# Patient Record
Sex: FEMALE | Race: WHITE | Employment: FULL TIME | URBAN - METROPOLITAN AREA
[De-identification: names, ages, dates, MRNs, and addresses within clinical notes are randomized per-mention and may not be internally consistent; named-entity substitution may affect disease eponyms.]

---

## 2017-04-04 ENCOUNTER — OFFICE VISIT (OUTPATIENT)
Dept: URGENT CARE | Facility: CLINIC | Age: 52
End: 2017-04-04
Payer: COMMERCIAL

## 2017-04-04 VITALS
HEART RATE: 76 BPM | BODY MASS INDEX: 36.03 KG/M2 | TEMPERATURE: 98.8 F | DIASTOLIC BLOOD PRESSURE: 74 MMHG | WEIGHT: 266 LBS | HEIGHT: 72 IN | SYSTOLIC BLOOD PRESSURE: 110 MMHG | OXYGEN SATURATION: 97 %

## 2017-04-04 DIAGNOSIS — B86 SCABIES: ICD-10-CM

## 2017-04-04 PROCEDURE — 99213 OFFICE O/P EST LOW 20 MIN: CPT | Performed by: FAMILY MEDICINE

## 2017-04-04 RX ORDER — PERMETHRIN 50 MG/G
1 CREAM TOPICAL ONCE
Qty: 1 TUBE | Refills: 1 | Status: SHIPPED | OUTPATIENT
Start: 2017-04-04 | End: 2017-04-04

## 2017-04-04 RX ORDER — IVERMECTIN 3 MG/1
200 TABLET ORAL ONCE
Qty: 8 TAB | Refills: 1 | Status: SHIPPED | OUTPATIENT
Start: 2017-04-04 | End: 2017-04-04

## 2017-04-04 ASSESSMENT — ENCOUNTER SYMPTOMS
FEVER: 0
VOMITING: 0
CHILLS: 0
SHORTNESS OF BREATH: 0
HEADACHES: 0
NAUSEA: 0
FATIGUE: 0

## 2017-04-04 NOTE — PATIENT INSTRUCTIONS
Body Lice, Frequently Asked Questions  Body lice are insects that live on the body and in the clothing or bedding of infested humans. Infestation is common, found worldwide, and affects people of all races. Body lice infestations spread rapidly under crowded conditions where hygiene is poor and where there is frequent contact among people.   WHERE ARE BODY LICE FOUND?  Body lice are found on the body and on clothing or bedding used by infested people. Lice eggs are laid in the seams of clothing or on bedding. Occasionally eggs are attached to body hair. Lice found on the hair and head are not body lice; they are head lice. Lice are usually associated with poor personal hygiene, which may occur during war or natural disaster. Infestation is unlikely in anyone who bathes regularly.  CAN BODY LICE TRANSMIT DISEASE?  Yes. Epidemics of typhus and louse-borne relapsing fever have been caused by body lice. (Louse is the singular form of lice.) Though typhus is no longer widespread, epidemics still occur during times of war, civil unrest, natural disasters, in refugee camps, and in prisons where people live crowded together in unsanitary conditions. Typhus still exists in places where climate, chronic poverty, and social customs prevent regular changes and laundering of clothing.  WHAT ARE THE SIGNS AND SYMPTOMS OF BODY LICE?  Itching and rash are common. Both of these symptoms are your body's allergic reaction to the lice bite. Severe infestation can cause fever, body and headaches. Long-term body lice infestations may lead to thickening and discoloration of the skin, particularly around the waist, groin, and upper thighs. Sores on the body may be caused by scratching. These sores can sometimes become infected with bacteria or fungi.  HOW ARE BODY LICE SPREAD?  Body lice are spread directly through contact with a person who has body lice, or indirectly through shared clothing, beds, bed linens, or towels.  WHAT DO BODY  LICE LOOK LIKE?  There are three forms of body lice:   · The egg (sometimes called a nit).   · Nits are body lice eggs. They are generally easy to see in the seams of clothing, particularly around the waistline and under armpits. They are a bit smaller than the size of a pinhead. Nits may also be attached to body hair. They are oval and usually yellow to white. Nits may take 30 days to delvalle.   · The nymph.   · The egg hatches into a baby louse called a nymph. It looks like an adult body louse but is smaller. Nymphs mature into adults about 7 days after hatching. To live, the nymph must feed on blood.   · The adult.   · The adult body louse is about the size of a sesame seed, has 6 legs and is tan to grayish-white. Females lay eggs. To live, adult lice need to feed on blood. If the louse falls off of a person, it dies within 10 days.   HOW IS A BODY LICE INFESTATION DIAGNOSED?  Diagnosis is made by looking closely in the seams of clothing and on the body for eggs and for crawling lice. Diagnosis should be made by a health care provider if you are unsure about infestation.   HOW ARE BODY LICE TREATED?  Lice infestations are generally treated by giving the infested person a clean change of clothes, a shower, and by laundering all worn clothing, bed linens, and towels. When laundering items, use the hot cycle (130°F / 55°C) of the washing machine. Set the dryer to the hot cycle to dry items. Items that cannot be laundered may be stored in a sealed plastic bag for 2 weeks or thrown away. Additionally, a 1% permethrin or pyrethrin lice shampoo, (also called pediculicide), may be applied to the body. Medication should be applied exactly as directed on the bottle or by your caregiver. Medicine is generally not needed if good hygiene is maintained and if laundering can be done at least once a week.  Document Released: 08/29/2005 Document Revised: 03/11/2013 Document Reviewed: 07/06/2009  ExitCare® Patient Information ©2013  [facility].

## 2017-04-04 NOTE — PROGRESS NOTES
"Subjective:      Jose Verma is a 51 y.o. female who presents with Other            Other  This is a new problem. The current episode started 1 to 4 weeks ago (10 days). The problem has been gradually worsening. Associated symptoms include a rash. Pertinent negatives include no chest pain, chills, fatigue, fever, headaches, nausea or vomiting. Exacerbated by: worse at night  Treatments tried: alcohol, cortisone, alovera, tea tree oil. The treatment provided mild relief.       Review of Systems   Constitutional: Negative for fever, chills and fatigue.   Respiratory: Negative for shortness of breath.    Cardiovascular: Negative for chest pain.   Gastrointestinal: Negative for nausea and vomiting.   Skin: Positive for rash.   Neurological: Negative for headaches.     PMH:  has no past medical history of Allergy, unspecified not elsewhere classified, Arthritis, Unspecified asthma(493.90), or Type II or unspecified type diabetes mellitus without mention of complication, not stated as uncontrolled.  MEDS: No current outpatient prescriptions on file.  ALLERGIES:   Allergies   Allergen Reactions   • Tussionex Pennkinetic Er [Chlorpheniramine-Hydrocodone]      SURGHX:   Past Surgical History   Procedure Laterality Date   • Tubal coagulation laparoscopic bilateral       SOCHX:  reports that she has never smoked. She has never used smokeless tobacco. She reports that she drinks alcohol. She reports that she does not use illicit drugs.  FH: Family history was reviewed, no pertinent findings to report      Objective:     /74 mmHg  Pulse 76  Temp(Src) 37.1 °C (98.8 °F)  Ht 1.854 m (6' 1\")  Wt 120.657 kg (266 lb)  BMI 35.10 kg/m2  SpO2 97%  Breastfeeding? No     Physical Exam   Constitutional: She appears well-developed. No distress.   Pulmonary/Chest: Effort normal.   Skin: Skin is warm and dry. She is not diaphoretic. No erythema.        Psychiatric: She has a normal mood and affect. Her behavior is normal. "               Assessment/Plan:     1. Scabies  Ivermectin 3 MG Tab    permethrin (ELIMITE) 5 % Cream     Supportive care  Push fluids  Monitor temperature  Follow-up if symptoms worsen or fail to improve

## 2017-04-04 NOTE — MR AVS SNAPSHOT
"        Jose Verma   2017 3:30 PM   Office Visit   MRN: [id number]    Department:  [facility]   Dept Phone:  [phone]    Description:  Female : 1965   Provider:  Percy Liriano M.D.           Reason for Visit     Other itchy - Scabies exposure. Roommate being treated      Allergies as of 2017     Allergen Noted Reactions    Tussionex Pennkinetic Er [Chlorpheniramine-Hydrocodone] 2014         Vital Signs     Blood Pressure Pulse Temperature Height Weight Body Mass Index    110/74 mmHg 76 37.1 °C (98.8 °F) 1.854 m (6' 1\") 120.657 kg (266 lb) 35.10 kg/m2    Oxygen Saturation Breastfeeding? Smoking Status             97% No Never Smoker          Basic Information     Date Of Birth Sex Race Ethnicity Preferred Language    1965 Female White Unknown English      Health Maintenance        Date Due Completion Dates    IMM DTaP/Tdap/Td Vaccine (1 - Tdap) 1984 ---    PAP SMEAR 1986 ---    MAMMOGRAM 2008, 2006, 2006    COLONOSCOPY 2015 ---            Current Immunizations     No immunizations on file.      Below and/or attached are the medications your provider expects you to take. Review all of your home medications and newly ordered medications with your provider and/or pharmacist. Follow medication instructions as directed by your provider and/or pharmacist. Please keep your medication list with you and share with your provider. Update the information when medications are discontinued, doses are changed, or new medications (including over-the-counter products) are added; and carry medication information at all times in the event of emergency situations     Allergies:  TUSSIONEX PENNKINETIC ER - (reactions not documented)               Medications  Valid as of: 2017 -  3:56 PM    Generic Name Brand Name Tablet Size Instructions for use    .                 Medicines prescribed today were sent to:     [id number] - [place] - [address] [facility]    [address] [facility] [place] [address]    Phone: [phone] Fax: [phone]    Open 24 Hours?: No      Medication refill instructions:       If your prescription bottle indicates you have medication refills left, it is not necessary to call your provider’s office. Please contact your pharmacy and they will refill your medication.    If your prescription bottle indicates you do not have any refills left, you may request refills at any time through one of the following ways: The online [facility] system (except Urgent Care), by calling your provider’s office, or by asking your pharmacy to contact your provider’s office with a refill request. Medication refills are processed only during regular business hours and may not be available until the next business day. Your provider may request additional information or to have a follow-up visit with you prior to refilling your medication.   *Please Note: Medication refills are assigned a new Rx number when refilled electronically. Your pharmacy may indicate that no refills were authorized even though a new prescription for the same medication is available at the pharmacy. Please request the medicine by name with the pharmacy before contacting your provider for a refill.        Instructions    Body Lice, Frequently Asked Questions  Body lice are insects that live on the body and in the clothing or bedding of infested humans. Infestation is common, found worldwide, and affects people of all races. Body lice infestations spread rapidly under crowded conditions where hygiene is poor and where there is frequent contact among people.   WHERE ARE BODY LICE FOUND?  Body lice are found on the body and on clothing or bedding used by infested people. Lice eggs are laid in the seams of clothing or on bedding. Occasionally eggs are attached to body hair. Lice found on the hair and head are not body lice; they are head lice. Lice are usually associated with poor personal  hygiene, which may occur during war or natural disaster. Infestation is unlikely in anyone who bathes regularly.  CAN BODY LICE TRANSMIT DISEASE?  Yes. Epidemics of typhus and louse-borne relapsing fever have been caused by body lice. (Louse is the singular form of lice.) Though typhus is no longer widespread, epidemics still occur during times of war, civil unrest, natural disasters, in refugee camps, and in prisons where people live crowded together in unsanitary conditions. Typhus still exists in places where climate, chronic poverty, and social customs prevent regular changes and laundering of clothing.  WHAT ARE THE SIGNS AND SYMPTOMS OF BODY LICE?  Itching and rash are common. Both of these symptoms are your body's allergic reaction to the lice bite. Severe infestation can cause fever, body and headaches. Long-term body lice infestations may lead to thickening and discoloration of the skin, particularly around the waist, groin, and upper thighs. Sores on the body may be caused by scratching. These sores can sometimes become infected with bacteria or fungi.  HOW ARE BODY LICE SPREAD?  Body lice are spread directly through contact with a person who has body lice, or indirectly through shared clothing, beds, bed linens, or towels.  WHAT DO BODY LICE LOOK LIKE?  There are three forms of body lice:   · The egg (sometimes called a nit).   · Nits are body lice eggs. They are generally easy to see in the seams of clothing, particularly around the waistline and under armpits. They are a bit smaller than the size of a pinhead. Nits may also be attached to body hair. They are oval and usually yellow to white. Nits may take 30 days to delvalle.   · The nymph.   · The egg hatches into a baby louse called a nymph. It looks like an adult body louse but is smaller. Nymphs mature into adults about 7 days after hatching. To live, the nymph must feed on blood.   · The adult.   · The adult body louse is about the size of a sesame  seed, has 6 legs and is tan to grayish-white. Females lay eggs. To live, adult lice need to feed on blood. If the louse falls off of a person, it dies within 10 days.   HOW IS A BODY LICE INFESTATION DIAGNOSED?  Diagnosis is made by looking closely in the seams of clothing and on the body for eggs and for crawling lice. Diagnosis should be made by a health care provider if you are unsure about infestation.   HOW ARE BODY LICE TREATED?  Lice infestations are generally treated by giving the infested person a clean change of clothes, a shower, and by laundering all worn clothing, bed linens, and towels. When laundering items, use the hot cycle (130°F / 55°C) of the washing machine. Set the dryer to the hot cycle to dry items. Items that cannot be laundered may be stored in a sealed plastic bag for 2 weeks or thrown away. Additionally, a 1% permethrin or pyrethrin lice shampoo, (also called pediculicide), may be applied to the body. Medication should be applied exactly as directed on the bottle or by your caregiver. Medicine is generally not needed if good hygiene is maintained and if laundering can be done at least once a week.  Document Released: 08/29/2005 Document Revised: 03/11/2013 Document Reviewed: 07/06/2009  ExitCare® Patient Information ©2013 [employer].          Innovid Access Code: [email or web address]  Expires: 5/4/2017  3:56 PM    Your email address is not on file at [facility].  Email Addresses are required for you to sign up for Innovid, please contact [phone] to verify your personal information and to provide your email address prior to attempting to register for Innovid.    Jose Verma  [id number]  [place] [address]    Innovid  A secure, online tool to manage your health information     [facility]’s Innovid® is a secure, online tool that connects you to your personalized health information from the privacy of your home -- day or night - making it very easy for you to manage  your healthcare. Once the activation process is completed, you can even access your medical information using the LendingRobot tawana, which is available for free in the Apple Tawana store or Google Play store.     To learn more about LendingRobot, visit [email or web address]    There are two levels of access available (as shown below):   My Chart Features  [facility] Doctor [facility]  Specialists [facility]  Urgent  Care Non-[facility] Primary Care Doctor   Email your healthcare team securely and privately 24/7 X X X    Manage appointments: schedule your next appointment; view details of past/upcoming appointments X      Request prescription refills. X      View recent personal medical records, including lab and immunizations X X X X   View health record, including health history, allergies, medications X X X X   Read reports about your outpatient visits, procedures, consult and ER notes X X X X   See your discharge summary, which is a recap of your hospital and/or ER visit that includes your diagnosis, lab results, and care plan X X  X     How to register for LendingRobot:  Once your e-mail address has been verified, follow the following steps to sign up for LendingRobot.     1. Go to  [email or web address]  2. Click on the Sign Up Now box, which takes you to the New Member Sign Up page. You will need to provide the following information:  a. Enter your LendingRobot Access Code exactly as it appears at the top of this page. (You will not need to use this code after you’ve completed the sign-up process. If you do not sign up before the expiration date, you must request a new code.)   b. Enter your date of birth.   c. Enter your home email address.   d. Click Submit, and follow the next screen’s instructions.  3. Create a LendingRobot ID. This will be your LendingRobot login ID and cannot be changed, so think of one that is secure and easy to remember.  4. Create a LendingRobot password. You can change your password at any time.  5. Enter your Password  Reset Question and Answer. This can be used at a later time if you forget your password.   6. Enter your e-mail address. This allows you to receive e-mail notifications when new information is available in broadbandchoicest.  7. Click Sign Up. You can now view your health information.    For assistance activating your Prezto account, call [phone]

## 2017-04-19 ENCOUNTER — OFFICE VISIT (OUTPATIENT)
Dept: URGENT CARE | Facility: CLINIC | Age: 52
End: 2017-04-19
Payer: COMMERCIAL

## 2017-04-19 VITALS
OXYGEN SATURATION: 97 % | DIASTOLIC BLOOD PRESSURE: 90 MMHG | RESPIRATION RATE: 16 BRPM | TEMPERATURE: 99.4 F | HEART RATE: 79 BPM | SYSTOLIC BLOOD PRESSURE: 120 MMHG

## 2017-04-19 DIAGNOSIS — H60.331 ACUTE SWIMMER'S EAR OF RIGHT SIDE: ICD-10-CM

## 2017-04-19 DIAGNOSIS — Z86.19 HISTORY OF SCABIES: ICD-10-CM

## 2017-04-19 PROCEDURE — 99214 OFFICE O/P EST MOD 30 MIN: CPT | Performed by: NURSE PRACTITIONER

## 2017-04-19 RX ORDER — IBUPROFEN 200 MG
400 TABLET ORAL EVERY 6 HOURS PRN
COMMUNITY

## 2017-04-19 RX ORDER — AMOXICILLIN 875 MG/1
875 TABLET, COATED ORAL 2 TIMES DAILY
Qty: 14 TAB | Refills: 0 | Status: SHIPPED | OUTPATIENT
Start: 2017-04-19 | End: 2017-04-26

## 2017-04-19 RX ORDER — PERMETHRIN 50 MG/G
CREAM TOPICAL
Qty: 1 TUBE | Refills: 1 | Status: SHIPPED | OUTPATIENT
Start: 2017-04-19

## 2017-04-19 RX ORDER — NEOMYCIN SULFATE, POLYMYXIN B SULFATE AND HYDROCORTISONE 10; 3.5; 1 MG/ML; MG/ML; [USP'U]/ML
5 SUSPENSION/ DROPS AURICULAR (OTIC) 3 TIMES DAILY
Qty: 1 BOTTLE | Refills: 0 | Status: SHIPPED | OUTPATIENT
Start: 2017-04-19 | End: 2017-04-26

## 2017-04-19 ASSESSMENT — ENCOUNTER SYMPTOMS
SORE THROAT: 0
FEVER: 0
COUGH: 0
RHINORRHEA: 0
CHILLS: 0
DIZZINESS: 0
VOMITING: 0
MYALGIAS: 0

## 2017-04-19 NOTE — MR AVS SNAPSHOT
Jose Verma   2017 4:15 PM   Office Visit   MRN: [id number]    Department:  [facility]   Dept Phone:  [phone]    Description:  Female : 1965   Provider:  DAMION Tello           Reason for Visit     Otalgia right ear feeling ear drum is rumpture, painful to chew    Follow-Up seen last week for rash, have questions      Allergies as of 2017     Allergen Noted Reactions    Tussionex Pennkinetic Er [Chlorpheniramine-Hydrocodone] 2014         You were diagnosed with     History of scabies   [412482]       Acute swimmer's ear of right side   [4996984]         Vital Signs     Blood Pressure Pulse Temperature Respirations Oxygen Saturation Smoking Status    120/90 mmHg 79 37.4 °C (99.4 °F) 16 97% Never Smoker       Basic Information     Date Of Birth Sex Race Ethnicity Preferred Language    1965 Female White Unknown English      Health Maintenance        Date Due Completion Dates    IMM DTaP/Tdap/Td Vaccine (1 - Tdap) 1984 ---    PAP SMEAR 1986 ---    MAMMOGRAM 2008, 2006, 2006    COLONOSCOPY 2015 ---            Current Immunizations     No immunizations on file.      Below and/or attached are the medications your provider expects you to take. Review all of your home medications and newly ordered medications with your provider and/or pharmacist. Follow medication instructions as directed by your provider and/or pharmacist. Please keep your medication list with you and share with your provider. Update the information when medications are discontinued, doses are changed, or new medications (including over-the-counter products) are added; and carry medication information at all times in the event of emergency situations     Allergies:  TUSSIONEX PENNKINETIC ER - (reactions not documented)               Medications  Valid as of: 2017 -  4:35 PM    Generic Name Brand Name Tablet Size Instructions for use    Amoxicillin  (Tab) AMOXIL 875 MG Take 1 Tab by mouth 2 times a day for 7 days.        Ibuprofen (Tab) MOTRIN 200 MG Take 400 mg by mouth every 6 hours as needed.        Neomycin-Polymyxin-HC (Suspension) PEDIOTIC HC 3.5-52906-0 Place 5 Drops in ear 3 times a day for 7 days.        Permethrin (Cream) ELIMITE 5 % Apply to whole body from neck down and then shower off in 8 hours.        .                 Medicines prescribed today were sent to:     [facility]ACY #[id number] - [place] - [address]    [address] [place] [address]    Phone: [phone] Fax: [phone]    Open 24 Hours?: No      Medication refill instructions:       If your prescription bottle indicates you have medication refills left, it is not necessary to call your provider’s office. Please contact your pharmacy and they will refill your medication.    If your prescription bottle indicates you do not have any refills left, you may request refills at any time through one of the following ways: The online [employer] system (except Urgent Care), by calling your provider’s office, or by asking your pharmacy to contact your provider’s office with a refill request. Medication refills are processed only during regular business hours and may not be available until the next business day. Your provider may request additional information or to have a follow-up visit with you prior to refilling your medication.   *Please Note: Medication refills are assigned a new Rx number when refilled electronically. Your pharmacy may indicate that no refills were authorized even though a new prescription for the same medication is available at the pharmacy. Please request the medicine by name with the pharmacy before contacting your provider for a refill.           [employer] Access Code: [email or web address]  Expires: 5/4/2017  3:56 PM    Your email address is not on file at [facility].  Email Addresses are required for you to sign up for [employer], please contact [phone] to  verify your personal information and to provide your email address prior to attempting to register for [employer].    Jose Verma  [id number]  [place] [address]    [employer]  A secure, online tool to manage your health information     [email or web address]’s Waveseer® is a secure, online tool that connects you to your personalized health information from the privacy of your home -- day or night - making it very easy for you to manage your healthcare. Once the activation process is completed, you can even access your medical information using the Waveseer tawana, which is available for free in the Apple Tawana store or Google Play store.     To learn more about Waveseer, visit [email or web address]    There are two levels of access available (as shown below):   My Chart Features  [facility] Primary Care Doctor [facility]  Specialists [facility]  Urgent  Care Non-[facility] Primary Care Doctor   Email your healthcare team securely and privately 24/7 X X X    Manage appointments: schedule your next appointment; view details of past/upcoming appointments X      Request prescription refills. X      View recent personal medical records, including lab and immunizations X X X X   View health record, including health history, allergies, medications X X X X   Read reports about your outpatient visits, procedures, consult and ER notes X X X X   See your discharge summary, which is a recap of your hospital and/or ER visit that includes your diagnosis, lab results, and care plan X X  X     How to register for [employer]:  Once your e-mail address has been verified, follow the following steps to sign up for [employer].     1. Go to  [email or web address]  2. Click on the Sign Up Now box, which takes you to the New Member Sign Up page. You will need to provide the following information:  a. Enter your Waveseer Access Code exactly as it appears at the top of this page. (You will not need to use this code after you’ve completed the sign-up process. If you do  not sign up before the expiration date, you must request a new code.)   b. Enter your date of birth.   c. Enter your home email address.   d. Click Submit, and follow the next screen’s instructions.  3. Create a World Reviewer ID. This will be your World Reviewer login ID and cannot be changed, so think of one that is secure and easy to remember.  4. Create a Storm Tactical Productst password. You can change your password at any time.  5. Enter your Password Reset Question and Answer. This can be used at a later time if you forget your password.   6. Enter your e-mail address. This allows you to receive e-mail notifications when new information is available in World Reviewer.  7. Click Sign Up. You can now view your health information.    For assistance activating your World Reviewer account, call [phone]

## 2017-04-19 NOTE — PROGRESS NOTES
Subjective:      Jose Verma is a 51 y.o. female who presents with Otalgia and Follow-Up            Otalgia   There is pain in the right ear. This is a new problem. The current episode started in the past 7 days. The problem occurs constantly. The problem has been gradually worsening. There has been no fever. The pain is moderate. Associated symptoms include hearing loss and a rash. Pertinent negatives include no coughing, rhinorrhea, sore throat or vomiting. She has tried nothing for the symptoms.   Allergies, medications and history reviewed by me today   denies water activities. Would also like refill on scabies cream.    Review of Systems   Constitutional: Negative for fever, chills and malaise/fatigue.   HENT: Positive for ear pain and hearing loss. Negative for congestion, rhinorrhea and sore throat.    Respiratory: Negative for cough.    Gastrointestinal: Negative for vomiting.   Musculoskeletal: Negative for myalgias.   Skin: Positive for rash. Negative for itching.   Neurological: Negative for dizziness.          Objective:     /90 mmHg  Pulse 79  Temp(Src) 37.4 °C (99.4 °F)  Resp 16  SpO2 97%     Physical Exam   Constitutional: She is oriented to person, place, and time. She appears well-developed and well-nourished. No distress.   HENT:   Head: Normocephalic and atraumatic.   Right Ear: Ear canal normal. Tympanic membrane is not injected and not perforated. No middle ear effusion.   Left Ear: External ear and ear canal normal. Tympanic membrane is not injected and not perforated.  No middle ear effusion.   Nose: Mucosal edema present.   Mouth/Throat: Posterior oropharyngeal erythema present. No oropharyngeal exudate.   Mucopurulent discharge to canal on right ear. +tragal and helical tenderness. Cannot fully visualize TM.   Eyes: Conjunctivae are normal. Right eye exhibits no discharge. Left eye exhibits no discharge.   Neck: Normal range of motion. Neck supple.   Cardiovascular: Normal  rate, regular rhythm and normal heart sounds.    No murmur heard.  Pulmonary/Chest: Effort normal and breath sounds normal. No respiratory distress.   Musculoskeletal: Normal range of motion.   Normal movement of all 4 extremities.   Lymphadenopathy:     She has no cervical adenopathy.        Right: No supraclavicular adenopathy present.        Left: No supraclavicular adenopathy present.   Neurological: She is alert and oriented to person, place, and time. Gait normal.   Skin: Skin is warm and dry.   Psychiatric: She has a normal mood and affect. Her behavior is normal. Thought content normal.   Nursing note and vitals reviewed.              Assessment/Plan:     1. History of scabies  permethrin (ELIMITE) 5 % Cream   2. Acute swimmer's ear of right side  neomycin-polymyxin-HC (PEDIOTIC HC) 3.5-27534-9 Suspension    amoxicillin (AMOXIL) 875 MG tablet     Differential diagnosis, natural history, supportive care, and indications for immediate follow-up discussed at length.

## 2017-12-18 ENCOUNTER — OFFICE VISIT (OUTPATIENT)
Dept: URGENT CARE | Facility: CLINIC | Age: 52
End: 2017-12-18
Payer: COMMERCIAL

## 2017-12-18 VITALS
BODY MASS INDEX: 38.87 KG/M2 | RESPIRATION RATE: 20 BRPM | OXYGEN SATURATION: 96 % | TEMPERATURE: 98 F | WEIGHT: 287 LBS | SYSTOLIC BLOOD PRESSURE: 120 MMHG | HEART RATE: 80 BPM | HEIGHT: 72 IN | DIASTOLIC BLOOD PRESSURE: 80 MMHG

## 2017-12-18 DIAGNOSIS — H10.9 CONJUNCTIVITIS OF BOTH EYES, UNSPECIFIED CONJUNCTIVITIS TYPE: ICD-10-CM

## 2017-12-18 DIAGNOSIS — J01.00 ACUTE MAXILLARY SINUSITIS, RECURRENCE NOT SPECIFIED: ICD-10-CM

## 2017-12-18 PROCEDURE — 99214 OFFICE O/P EST MOD 30 MIN: CPT | Performed by: NURSE PRACTITIONER

## 2017-12-18 RX ORDER — POLYMYXIN B SULFATE AND TRIMETHOPRIM 1; 10000 MG/ML; [USP'U]/ML
1 SOLUTION OPHTHALMIC EVERY 4 HOURS
Qty: 10 ML | Refills: 0 | Status: SHIPPED | OUTPATIENT
Start: 2017-12-18

## 2017-12-18 RX ORDER — AMOXICILLIN AND CLAVULANATE POTASSIUM 875; 125 MG/1; MG/1
1 TABLET, FILM COATED ORAL 2 TIMES DAILY
Qty: 14 TAB | Refills: 0 | Status: SHIPPED | OUTPATIENT
Start: 2017-12-18 | End: 2017-12-25

## 2017-12-18 ASSESSMENT — ENCOUNTER SYMPTOMS
FEVER: 0
EYE PAIN: 0
EYE REDNESS: 0
EYE DISCHARGE: 1
SINUS PRESSURE: 1
SINUS PAIN: 1
SORE THROAT: 1

## 2017-12-18 NOTE — PROGRESS NOTES
"Subjective:      Jose Verma is a 52 y.o. female who presents with Sinus Problem (Over a week dry cough , stuffy nose , left eye weeping .)            Sinusitis   This is a new problem. Episode onset: Reports sinus congestion for over a week. Developed bilateral eye drainage 2 days ago. She feels tired and that her eyes are swollen/puffy. The problem has been gradually worsening since onset. There has been no fever. The pain is mild. Associated symptoms include congestion, sinus pressure and a sore throat. Past treatments include oral decongestants. The treatment provided no relief.       Review of Systems   Constitutional: Positive for malaise/fatigue. Negative for fever.   HENT: Positive for congestion, sinus pain, sinus pressure and sore throat.    Eyes: Positive for discharge. Negative for pain and redness.   All other systems reviewed and are negative.    No past medical history on file.   Past Surgical History:   Procedure Laterality Date   • TUBAL COAGULATION LAPAROSCOPIC BILATERAL        Social History     Social History   • Marital status:      Spouse name: N/A   • Number of children: N/A   • Years of education: N/A     Occupational History   • Not on file.     Social History Main Topics   • Smoking status: Never Smoker   • Smokeless tobacco: Never Used   • Alcohol use 0.0 - 1.8 oz/week   • Drug use: No   • Sexual activity: Yes     Partners: Male     Other Topics Concern   • Not on file     Social History Narrative   • No narrative on file          Objective:     /80   Pulse 80   Temp 36.7 °C (98 °F)   Resp 20   Ht 1.88 m (6' 2\")   Wt (!) 130.2 kg (287 lb)   SpO2 96%   BMI 36.85 kg/m²      Physical Exam   Constitutional: She is oriented to person, place, and time. Vital signs are normal. She appears well-developed and well-nourished.   HENT:   Head: Normocephalic and atraumatic.   Right Ear: Tympanic membrane and external ear normal.   Left Ear: Tympanic membrane and external ear " normal.   Nose: Mucosal edema, rhinorrhea and sinus tenderness present. Right sinus exhibits maxillary sinus tenderness. Left sinus exhibits maxillary sinus tenderness.   Mouth/Throat: Posterior oropharyngeal erythema present.   Eyes: EOM are normal. Pupils are equal, round, and reactive to light.   Neck: Normal range of motion.   Cardiovascular: Normal rate and regular rhythm.    Pulmonary/Chest: Effort normal and breath sounds normal.   Musculoskeletal: Normal range of motion.   Lymphadenopathy:        Head (right side): Submandibular adenopathy present.        Head (left side): Submandibular adenopathy present.   Neurological: She is alert and oriented to person, place, and time.   Skin: Skin is warm and dry. Capillary refill takes less than 2 seconds.   Psychiatric: She has a normal mood and affect. Her speech is normal and behavior is normal. Thought content normal.   Vitals reviewed.              Assessment/Plan:     1. Acute maxillary sinusitis, recurrence not specified  - amoxicillin-clavulanate (AUGMENTIN) 875-125 MG Tab; Take 1 Tab by mouth 2 times a day for 7 days.  Dispense: 14 Tab; Refill: 0    2. Conjunctivitis of both eyes, unspecified conjunctivitis type  - polymixin-trimethoprim (POLYTRIM) 89511-5.1 UNIT/ML-% Solution; Place 1 Drop in both eyes every 4 hours.  Dispense: 10 mL; Refill: 0    Increase water intake  OTC decongestant as directed  Warm compresses to both eyes TID  Tylenol and Ibuprofen PRN pain/fever  Supportive care, differential diagnoses, and indications for immediate follow-up discussed with patient.    Pathogenesis of diagnosis discussed including typical length and natural progression.      Instructed to return to  or nearest emergency department if symptoms fail to improve, for any change in condition, further concerns, or new concerning symptoms.  Patient states understanding of the plan of care and discharge instructions.